# Patient Record
Sex: FEMALE | Race: WHITE | Employment: FULL TIME | URBAN - METROPOLITAN AREA
[De-identification: names, ages, dates, MRNs, and addresses within clinical notes are randomized per-mention and may not be internally consistent; named-entity substitution may affect disease eponyms.]

---

## 2021-06-07 ENCOUNTER — TELEPHONE (OUTPATIENT)
Dept: INFECTIOUS DISEASES | Age: 61
End: 2021-06-07

## 2021-06-07 DIAGNOSIS — L03.115 BILATERAL LOWER LEG CELLULITIS: Primary | ICD-10-CM

## 2021-06-07 DIAGNOSIS — N17.9 AKI (ACUTE KIDNEY INJURY) (HCC): ICD-10-CM

## 2021-06-07 DIAGNOSIS — L03.116 BILATERAL LOWER LEG CELLULITIS: Primary | ICD-10-CM

## 2021-06-07 DIAGNOSIS — A49.01 MSSA (METHICILLIN SUSCEPTIBLE STAPHYLOCOCCUS AUREUS): ICD-10-CM

## 2021-06-07 NOTE — TELEPHONE ENCOUNTER
[facility] AT [facility] Nurse reports patient has a Medline, dc from [facility] on 6/2/2021 with 2 weeks Iv Abx. Has No Blood Return and Difficult to Flush. No F/u appt made w/ ID to date. Nurse was able to draw Labs peripherally. Will update ID Physician for further orders.

## 2021-06-08 NOTE — TELEPHONE ENCOUNTER
Per consult with Dr Mikayla Kelley with the Pic[facility] Team about Cath Timoteo, otherwise will need Replaced. Call to [address] [facility], and per Miguel Wood, their team can assist with Replacement at 10:30am tomorrow, 6/9/21. Call to patient, at [phone], wrong #. Call to [phone], left brief message, request a return call. Call to [facility] Team #5, request an updated contact phone number and/or to confirm. [facility] advised the \"[phone]\" is what they have and [phone]. Call to the last number, it is another establishment. No message left there. Return call to nurse Andres at [phone], [facility].

## 2021-06-09 NOTE — TELEPHONE ENCOUNTER
[facility] request a New Order, per Dr Ponce Fitting to replace CVC with a Picc Line. Order placed and Faxed.

## 2021-06-16 ENCOUNTER — TELEPHONE (OUTPATIENT)
Dept: INFECTIOUS DISEASES | Age: 61
End: 2021-06-16

## 2021-06-16 NOTE — TELEPHONE ENCOUNTER
Page to Dr Stephanie Hong, late yesterday afternoon, 6/15/21. Advised of EOT expected on 6/16/2021. Per Dr Stephanie Hong, if no improvemnet, cotinue Iv Abx until OV completed. Since daughter is Primary caregiver, and she can only schedule on M's and W's, the order is to Continue, IV Abx (Ceftiraxone 2-G, Q-24) until appt with ID, (6-21-21) Will provide further orders after that. Order relayed to Ankit at [facility]. .   Daughter aware and confirms the LE still have drainage, and she will have a F/u with [facility] in [facility], [address] [place] [address], on 6/23/21. She has been a patient there before.

## 2021-06-21 ENCOUNTER — VIRTUAL VISIT (OUTPATIENT)
Dept: INFECTIOUS DISEASES | Age: 61
End: 2021-06-21
Payer: MEDICARE

## 2021-06-21 DIAGNOSIS — L03.116 BILATERAL LOWER LEG CELLULITIS: Primary | ICD-10-CM

## 2021-06-21 DIAGNOSIS — A49.01 STAPH AUREUS INFECTION: ICD-10-CM

## 2021-06-21 DIAGNOSIS — L03.115 BILATERAL LOWER LEG CELLULITIS: Primary | ICD-10-CM

## 2021-06-21 DIAGNOSIS — L97.901 NON-PRESSURE ULCER OF LOWER EXTREMITY, LIMITED TO BREAKDOWN OF SKIN, UNSPECIFIED LATERALITY (HCC): ICD-10-CM

## 2021-06-21 PROCEDURE — 4004F PT TOBACCO SCREEN RCVD TLK: CPT | Performed by: INTERNAL MEDICINE

## 2021-06-21 PROCEDURE — 3017F COLORECTAL CA SCREEN DOC REV: CPT | Performed by: INTERNAL MEDICINE

## 2021-06-21 PROCEDURE — 99213 OFFICE O/P EST LOW 20 MIN: CPT | Performed by: INTERNAL MEDICINE

## 2021-06-21 PROCEDURE — G8428 CUR MEDS NOT DOCUMENT: HCPCS | Performed by: INTERNAL MEDICINE

## 2021-06-21 PROCEDURE — G8421 BMI NOT CALCULATED: HCPCS | Performed by: INTERNAL MEDICINE

## 2021-06-21 RX ORDER — CEPHALEXIN 500 MG/1
500 CAPSULE ORAL 3 TIMES DAILY
Qty: 90 CAPSULE | Refills: 0 | Status: SHIPPED | OUTPATIENT
Start: 2021-06-21 | End: 2021-07-21

## 2021-06-21 ASSESSMENT — ENCOUNTER SYMPTOMS
ABDOMINAL DISTENTION: 1
NAUSEA: 1

## 2021-07-19 ENCOUNTER — VIRTUAL VISIT (OUTPATIENT)
Dept: INFECTIOUS DISEASES | Age: 61
End: 2021-07-19
Payer: MEDICARE

## 2021-07-19 DIAGNOSIS — A49.01 STAPH AUREUS INFECTION: ICD-10-CM

## 2021-07-19 DIAGNOSIS — L03.115 BILATERAL LOWER LEG CELLULITIS: Primary | ICD-10-CM

## 2021-07-19 DIAGNOSIS — L03.116 BILATERAL LOWER LEG CELLULITIS: Primary | ICD-10-CM

## 2021-07-19 DIAGNOSIS — L97.901 NON-PRESSURE ULCER OF LOWER EXTREMITY, LIMITED TO BREAKDOWN OF SKIN, UNSPECIFIED LATERALITY (HCC): ICD-10-CM

## 2021-07-19 PROCEDURE — 3017F COLORECTAL CA SCREEN DOC REV: CPT | Performed by: INTERNAL MEDICINE

## 2021-07-19 PROCEDURE — 4004F PT TOBACCO SCREEN RCVD TLK: CPT | Performed by: INTERNAL MEDICINE

## 2021-07-19 PROCEDURE — 99213 OFFICE O/P EST LOW 20 MIN: CPT | Performed by: INTERNAL MEDICINE

## 2021-07-19 PROCEDURE — G8428 CUR MEDS NOT DOCUMENT: HCPCS | Performed by: INTERNAL MEDICINE

## 2021-07-19 PROCEDURE — G8421 BMI NOT CALCULATED: HCPCS | Performed by: INTERNAL MEDICINE

## 2021-07-19 RX ORDER — CEPHALEXIN 500 MG/1
500 CAPSULE ORAL 3 TIMES DAILY
Qty: 90 CAPSULE | Refills: 1 | Status: SHIPPED | OUTPATIENT
Start: 2021-07-19 | End: 2021-08-18

## 2021-07-19 ASSESSMENT — ENCOUNTER SYMPTOMS
GASTROINTESTINAL NEGATIVE: 1
RESPIRATORY NEGATIVE: 1

## 2021-07-19 NOTE — PROGRESS NOTES
file.      Current Outpatient Medications on File Prior to Visit   Medication Sig Dispense Refill    cephALEXin (KEFLEX) 500 MG capsule Take 1 capsule by mouth 3 times daily 90 capsule 0    RELION INSULIN SYR 0.5ML/31G 31G X 5/16\" 0.5 ML MISC USE ONE SYRINGE TWICE DAILY WITH  INSULIN. 100 each 0    NOVOLIN 70/30 RELION (70-30) 100 UNIT/ML injection INJECT 40 UNITS TWICE DIALY. 20 vial 0    levothyroxine (SYNTHROID) 75 MCG tablet TAKE ONE TABLET BY MOUTH DAILY 90 tablet 1    metFORMIN (GLUCOPHAGE) 1000 MG tablet TAKE 1 TABLET BY MOUTH TWICE DAILY WITH MEALS. 180 tablet 1    metFORMIN (GLUCOPHAGE) 1000 MG tablet TAKE 1 TABLET BY MOUTH TWICE DAILY WITH MEALS 180 tablet 0    Insulin Pen Needle (NOVOFINE) 32G X 6 MM MISC bid 100 each 11    glucose blood VI test strips (TRUETEST TEST) strip 1 each by Does not apply route daily. As needed. 100 each 3    TRUEPLUS LANCETS 33G MISC bid 100 each 11    Blood Glucose Monitoring Suppl (TRUETRACK BLOOD GLUCOSE) W/DEVICE KIT As directed 1 kit 00    benzonatate (TESSALON) 200 MG capsule       L-Methylfolate-B6-B12 2.8-25-2 MG TABS bid 60 tablet 06    buPROPion (WELLBUTRIN SR) 150 MG SR tablet Take 150 mg by mouth 2 times daily.  allopurinol (ZYLOPRIM) 300 MG tablet Take 300 mg by mouth daily.  zonisamide (ZONEGRAN) 100 MG capsule Take 100 mg by mouth 2 times daily.  furosemide (LASIX) 20 MG tablet Take 20 mg by mouth 2 times daily.  Glucosamine-Chondroit-Vit C-Mn (GLUCOSAMINE CHONDR 1500 COMPLX PO) Take  by mouth 2 times daily.  metoprolol (LOPRESSOR) 100 MG tablet Take 100 mg by mouth 2 times daily.  lisinopril-hydrochlorothiazide (PRINZIDE;ZESTORETIC) 20-25 MG per tablet Take 1 tablet by mouth 2 times daily. No current facility-administered medications on file prior to visit.        Allergies   Allergen Reactions    Acetaminophen     Amoxicillin     Amoxicillin-Pot Clavulanate     Aspirin     Darvocet [Propoxyphene N-Acetaminophen]     Lyrica [Pregabalin]     Mobic     Naproxen     Procardia [Nifedipine]     Zocor [Simvastatin]        Physical exam   legs are still swollen with open ulcerations      .    Lab Results   Component Value Date    WBC 12.0 07/26/2014    HGB 14.0 07/26/2014    HCT 40.2 07/26/2014    MCV 88.7 07/26/2014     07/26/2014     Lab Results   Component Value Date     04/03/2015    K 3.9 04/03/2015     04/03/2015    CO2 26 04/03/2015    BUN 10 04/03/2015    CREATININE 0.6 04/03/2015    GLUCOSE 268 04/03/2015    CALCIUM 9.6 04/03/2015                ASSESSMENT:  PLAN:  Cellulitis legs  Infected leg ulcerations with MSSA continue Keflex for another month follow-up 1 month

## 2021-08-30 ENCOUNTER — OFFICE VISIT (OUTPATIENT)
Dept: INFECTIOUS DISEASES | Age: 61
End: 2021-08-30
Payer: MEDICARE

## 2021-08-30 VITALS
BODY MASS INDEX: 58.74 KG/M2 | HEART RATE: 51 BPM | WEIGHT: 293 LBS | DIASTOLIC BLOOD PRESSURE: 72 MMHG | SYSTOLIC BLOOD PRESSURE: 141 MMHG | TEMPERATURE: 97.7 F

## 2021-08-30 DIAGNOSIS — I83.209 INFECTED STASIS ULCER OF LOWER EXTREMITY (HCC): ICD-10-CM

## 2021-08-30 DIAGNOSIS — L97.909 INFECTED STASIS ULCER OF LOWER EXTREMITY (HCC): ICD-10-CM

## 2021-08-30 DIAGNOSIS — L03.119 RECURRENT CELLULITIS OF LOWER LEG: Primary | ICD-10-CM

## 2021-08-30 PROCEDURE — 3017F COLORECTAL CA SCREEN DOC REV: CPT | Performed by: INTERNAL MEDICINE

## 2021-08-30 PROCEDURE — G8419 CALC BMI OUT NRM PARAM NOF/U: HCPCS | Performed by: INTERNAL MEDICINE

## 2021-08-30 PROCEDURE — 1036F TOBACCO NON-USER: CPT | Performed by: INTERNAL MEDICINE

## 2021-08-30 PROCEDURE — 99213 OFFICE O/P EST LOW 20 MIN: CPT | Performed by: INTERNAL MEDICINE

## 2021-08-30 PROCEDURE — G8428 CUR MEDS NOT DOCUMENT: HCPCS | Performed by: INTERNAL MEDICINE

## 2021-08-30 RX ORDER — POTASSIUM CHLORIDE 1.5 G/1.77G
40 POWDER, FOR SOLUTION ORAL 2 TIMES DAILY
COMMUNITY

## 2021-08-30 RX ORDER — PANTOPRAZOLE SODIUM 40 MG/1
40 GRANULE, DELAYED RELEASE ORAL
COMMUNITY

## 2021-08-30 RX ORDER — WARFARIN SODIUM 5 MG/1
5 TABLET ORAL SEE ADMIN INSTRUCTIONS
COMMUNITY

## 2021-08-30 RX ORDER — CEPHALEXIN 500 MG/1
500 CAPSULE ORAL 3 TIMES DAILY
Qty: 90 CAPSULE | Refills: 0 | Status: SHIPPED | OUTPATIENT
Start: 2021-08-30 | End: 2021-09-29

## 2021-08-30 NOTE — PROGRESS NOTES
Romulo Rodriguez (:  1960) is a 62 y.o. female,Established patient, here for evaluation of the following chief complaint(s):  Follow-Up from Hospital (BLE cellulitis )         ASSESSMENT/PLAN:  BLE cellulitis  BLE chronic nonhealing venous stasis ulcers  Methicillin sensitive staph aureus infection    F/U with Dr Anshul Castro wound care center  Refill Keflex 500 mg TID x 1 month  Continue compressive dressing  Advised to keep legs elevated when possible  Patient is probably ready to get skin grafts  Follow-up in 4 weeks  SUBJECTIVE/OBJECTIVE:  HPI  Follow-up bilateral lower extremity cellulitis and chronic nonhealing venous stasis ulcers with superimposed infection with methicillin sensitive staph aureus on p.o. Keflex that is well-tolerated. Bilateral legs ulcers with slow healing. Decreased drainage. Has recurrent cellulitis with resolved leg redness. No fevers or chills. No leg pain  Review of Systems   All other systems reviewed and are negative. Objective   Physical Exam  Vitals and nursing note reviewed. Constitutional:       General: She is not in acute distress. HENT:      Head: Normocephalic and atraumatic. Nose: No congestion. Eyes:      General: No scleral icterus. Pulmonary:      Effort: Pulmonary effort is normal. No respiratory distress. Abdominal:      General: There is no distension (.  Obese). Musculoskeletal:         General: Swelling (.  Bilateral lower extremity with chronic swelling and nonpitting edema, chronic brown discoloration chronic brown discoloration. Bilateral legs large deep ulcers with 100% granulation and minimal drainage ) present. Skin:     Findings: No erythema or rash. Neurological:      Mental Status: She is alert and oriented to person, place, and time. Psychiatric:         Mood and Affect: Mood normal.         Behavior: Behavior normal.     Body mass index is 58.74 kg/m².   INR of 1.3       On this date 2021 I have spent 20 minutes reviewing previous notes, test results and face to face with the patient discussing the diagnosis and importance of compliance with the treatment plan as well as documenting on the day of the visit. An electronic signature was used to authenticate this note.     --Benedict Dubin, MD